# Patient Record
(demographics unavailable — no encounter records)

---

## 2024-11-25 NOTE — PLAN
[FreeTextEntry1] : Prescription for next year's mammogram and ultrasound input into the system. Patient is going to follow-up with a dermatologist secondary to the recurrent skin irritation that she has that has been unresponsive in the past to nystatin cream and steroid creams.

## 2024-11-25 NOTE — PHYSICAL EXAM
[Appropriately responsive] : appropriately responsive [Alert] : alert [No Acute Distress] : no acute distress [No Lymphadenopathy] : no lymphadenopathy [Regular Rate Rhythm] : regular rate rhythm [No Murmurs] : no murmurs [Clear to Auscultation B/L] : clear to auscultation bilaterally [Soft] : soft [Non-tender] : non-tender [Non-distended] : non-distended [No HSM] : No HSM [No Lesions] : no lesions [No Mass] : no mass [Oriented x3] : oriented x3 [FreeTextEntry1] : Candidal rash of the external labia as well as swelling around the clitoral burns consistent with likely recurrent candidal rash.  No erythema or induration. [FreeTextEntry2] : Normal, no lesions noted. [FreeTextEntry4] : Normal, no lesions seen or palpated.  Scanty clear discharge in vault [FreeTextEntry5] : Smooth, pink, no lesions.  No cervical motion tenderness [FreeTextEntry6] : Anteverted, small, mobile, nontender.  No adnexal masses or tenderness bilaterally

## 2024-11-25 NOTE — HISTORY OF PRESENT ILLNESS
[Y] : Positive pregnancy history [No] : Patient does not have concerns regarding sex [Previously active] : previously active [Men] : men [Vaginal] : vaginal [TextBox_4] : Gets recurrent skin irritation in hip folds and under buttock. Seeing Derm to ask re: treatment options S/P BSO in past for benign ovarian cyst [Mammogramdate] : 11/11/24 [TextBox_19] : Z+P- with sono- Benign, stable right breast nodule [BreastSonogramDate] : 11/11/24 [ColonoscopyDate] : 05/22 [TextBox_43] : Negative- due q 5 years [PGHxTotal] : 3 [[facility] [FreeTextEntry1] :  x 3